# Patient Record
Sex: FEMALE | Race: WHITE | Employment: FULL TIME | URBAN - METROPOLITAN AREA
[De-identification: names, ages, dates, MRNs, and addresses within clinical notes are randomized per-mention and may not be internally consistent; named-entity substitution may affect disease eponyms.]

---

## 2020-12-07 LAB — SARS-COV-2: DETECTED

## 2020-12-10 ENCOUNTER — APPOINTMENT (OUTPATIENT)
Dept: GENERAL RADIOLOGY | Age: 40
DRG: 177 | End: 2020-12-10
Payer: COMMERCIAL

## 2020-12-10 ENCOUNTER — HOSPITAL ENCOUNTER (INPATIENT)
Age: 40
LOS: 2 days | Discharge: HOME | DRG: 177 | End: 2020-12-12
Attending: EMERGENCY MEDICINE | Admitting: HOSPITALIST
Payer: COMMERCIAL

## 2020-12-10 PROBLEM — J12.82 PNEUMONIA DUE TO COVID-19 VIRUS: Status: ACTIVE | Noted: 2020-12-10

## 2020-12-10 PROBLEM — U07.1 PNEUMONIA DUE TO COVID-19 VIRUS: Status: ACTIVE | Noted: 2020-12-10

## 2020-12-10 PROBLEM — J96.01 ACUTE HYPOXEMIC RESPIRATORY FAILURE DUE TO SEVERE ACUTE RESPIRATORY SYNDROME CORONAVIRUS 2 (SARS-COV-2) DISEASE (HCC): Status: ACTIVE | Noted: 2020-12-10

## 2020-12-10 PROBLEM — J96.01 ACUTE HYPOXEMIC RESPIRATORY FAILURE DUE TO COVID-19 (HCC): Status: ACTIVE | Noted: 2020-12-10

## 2020-12-10 PROBLEM — U07.1 ACUTE HYPOXEMIC RESPIRATORY FAILURE DUE TO SEVERE ACUTE RESPIRATORY SYNDROME CORONAVIRUS 2 (SARS-COV-2) DISEASE (HCC): Status: ACTIVE | Noted: 2020-12-10

## 2020-12-10 PROBLEM — E66.01 MORBID OBESITY WITH BODY MASS INDEX (BMI) OF 50.0 TO 59.9 IN ADULT (HCC): Status: ACTIVE | Noted: 2020-12-10

## 2020-12-10 PROBLEM — R03.0 ELEVATED BP WITHOUT DIAGNOSIS OF HYPERTENSION: Status: ACTIVE | Noted: 2020-12-10

## 2020-12-10 PROBLEM — U07.1 ACUTE HYPOXEMIC RESPIRATORY FAILURE DUE TO COVID-19 (HCC): Status: ACTIVE | Noted: 2020-12-10

## 2020-12-10 PROBLEM — J45.909 RAD (REACTIVE AIRWAY DISEASE): Status: ACTIVE | Noted: 2020-12-10

## 2020-12-10 LAB
A/G RATIO: 0.8 (ref 1.1–2.2)
A/G RATIO: 0.9 (ref 1.1–2.2)
ABO/RH: NORMAL
ABO/RH: NORMAL
ALBUMIN SERPL-MCNC: 3.6 G/DL (ref 3.4–5)
ALBUMIN SERPL-MCNC: 3.8 G/DL (ref 3.4–5)
ALP BLD-CCNC: 80 U/L (ref 40–129)
ALP BLD-CCNC: 88 U/L (ref 40–129)
ALT SERPL-CCNC: 48 U/L (ref 10–40)
ALT SERPL-CCNC: 49 U/L (ref 10–40)
ANION GAP SERPL CALCULATED.3IONS-SCNC: 11 MMOL/L (ref 3–16)
ANION GAP SERPL CALCULATED.3IONS-SCNC: 11 MMOL/L (ref 3–16)
ANTIBODY SCREEN: NORMAL
AST SERPL-CCNC: 49 U/L (ref 15–37)
AST SERPL-CCNC: 57 U/L (ref 15–37)
BASOPHILS ABSOLUTE: 0 K/UL (ref 0–0.2)
BASOPHILS RELATIVE PERCENT: 0.6 %
BILIRUB SERPL-MCNC: 0.3 MG/DL (ref 0–1)
BILIRUB SERPL-MCNC: 0.4 MG/DL (ref 0–1)
BUN BLDV-MCNC: 14 MG/DL (ref 7–20)
BUN BLDV-MCNC: 14 MG/DL (ref 7–20)
CALCIUM SERPL-MCNC: 8.8 MG/DL (ref 8.3–10.6)
CALCIUM SERPL-MCNC: 9 MG/DL (ref 8.3–10.6)
CHLORIDE BLD-SCNC: 100 MMOL/L (ref 99–110)
CHLORIDE BLD-SCNC: 101 MMOL/L (ref 99–110)
CO2: 23 MMOL/L (ref 21–32)
CO2: 25 MMOL/L (ref 21–32)
CREAT SERPL-MCNC: 0.8 MG/DL (ref 0.6–1.1)
CREAT SERPL-MCNC: 0.9 MG/DL (ref 0.6–1.1)
D DIMER: 504 NG/ML DDU (ref 0–229)
EKG ATRIAL RATE: 83 BPM
EKG DIAGNOSIS: NORMAL
EKG P AXIS: 22 DEGREES
EKG P-R INTERVAL: 138 MS
EKG Q-T INTERVAL: 370 MS
EKG QRS DURATION: 84 MS
EKG QTC CALCULATION (BAZETT): 434 MS
EKG R AXIS: -16 DEGREES
EKG T AXIS: 12 DEGREES
EKG VENTRICULAR RATE: 83 BPM
EOSINOPHILS ABSOLUTE: 0 K/UL (ref 0–0.6)
EOSINOPHILS RELATIVE PERCENT: 0.2 %
FIBRINOGEN: 515 MG/DL (ref 200–397)
GFR AFRICAN AMERICAN: >60
GFR AFRICAN AMERICAN: >60
GFR NON-AFRICAN AMERICAN: >60
GFR NON-AFRICAN AMERICAN: >60
GLOBULIN: 4.3 G/DL
GLOBULIN: 4.4 G/DL
GLUCOSE BLD-MCNC: 114 MG/DL (ref 70–99)
GLUCOSE BLD-MCNC: 118 MG/DL (ref 70–99)
HCG QUALITATIVE: NEGATIVE
HCT VFR BLD CALC: 46 % (ref 36–48)
HEMOGLOBIN: 15.2 G/DL (ref 12–16)
INR BLD: 1.06 (ref 0.86–1.14)
LACTATE DEHYDROGENASE: 526 U/L (ref 100–190)
LACTIC ACID, SEPSIS: 1.5 MMOL/L (ref 0.4–1.9)
LYMPHOCYTES ABSOLUTE: 1.5 K/UL (ref 1–5.1)
LYMPHOCYTES RELATIVE PERCENT: 32.9 %
MCH RBC QN AUTO: 27.5 PG (ref 26–34)
MCHC RBC AUTO-ENTMCNC: 32.9 G/DL (ref 31–36)
MCV RBC AUTO: 83.5 FL (ref 80–100)
MONOCYTES ABSOLUTE: 0.4 K/UL (ref 0–1.3)
MONOCYTES RELATIVE PERCENT: 8.9 %
NEUTROPHILS ABSOLUTE: 2.7 K/UL (ref 1.7–7.7)
NEUTROPHILS RELATIVE PERCENT: 57.4 %
PDW BLD-RTO: 13.9 % (ref 12.4–15.4)
PLATELET # BLD: 243 K/UL (ref 135–450)
PMV BLD AUTO: 7.7 FL (ref 5–10.5)
POTASSIUM REFLEX MAGNESIUM: 3.8 MMOL/L (ref 3.5–5.1)
POTASSIUM REFLEX MAGNESIUM: 5 MMOL/L (ref 3.5–5.1)
PRO-BNP: 7 PG/ML (ref 0–124)
PROCALCITONIN: 0.07 NG/ML (ref 0–0.15)
PROTHROMBIN TIME: 12.3 SEC (ref 10–13.2)
RBC # BLD: 5.51 M/UL (ref 4–5.2)
SODIUM BLD-SCNC: 135 MMOL/L (ref 136–145)
SODIUM BLD-SCNC: 136 MMOL/L (ref 136–145)
TOTAL PROTEIN: 8 G/DL (ref 6.4–8.2)
TOTAL PROTEIN: 8.1 G/DL (ref 6.4–8.2)
TROPONIN: <0.01 NG/ML
TROPONIN: <0.01 NG/ML
WBC # BLD: 4.7 K/UL (ref 4–11)

## 2020-12-10 PROCEDURE — 86900 BLOOD TYPING SEROLOGIC ABO: CPT

## 2020-12-10 PROCEDURE — 83615 LACTATE (LD) (LDH) ENZYME: CPT

## 2020-12-10 PROCEDURE — 84484 ASSAY OF TROPONIN QUANT: CPT

## 2020-12-10 PROCEDURE — 83605 ASSAY OF LACTIC ACID: CPT

## 2020-12-10 PROCEDURE — 93005 ELECTROCARDIOGRAM TRACING: CPT | Performed by: EMERGENCY MEDICINE

## 2020-12-10 PROCEDURE — 96365 THER/PROPH/DIAG IV INF INIT: CPT

## 2020-12-10 PROCEDURE — 71045 X-RAY EXAM CHEST 1 VIEW: CPT

## 2020-12-10 PROCEDURE — 36415 COLL VENOUS BLD VENIPUNCTURE: CPT

## 2020-12-10 PROCEDURE — 99284 EMERGENCY DEPT VISIT MOD MDM: CPT

## 2020-12-10 PROCEDURE — 82306 VITAMIN D 25 HYDROXY: CPT

## 2020-12-10 PROCEDURE — 84145 PROCALCITONIN (PCT): CPT

## 2020-12-10 PROCEDURE — 86850 RBC ANTIBODY SCREEN: CPT

## 2020-12-10 PROCEDURE — 85384 FIBRINOGEN ACTIVITY: CPT

## 2020-12-10 PROCEDURE — 93010 ELECTROCARDIOGRAM REPORT: CPT | Performed by: INTERNAL MEDICINE

## 2020-12-10 PROCEDURE — 2580000003 HC RX 258: Performed by: HOSPITALIST

## 2020-12-10 PROCEDURE — 2700000000 HC OXYGEN THERAPY PER DAY

## 2020-12-10 PROCEDURE — 1200000000 HC SEMI PRIVATE

## 2020-12-10 PROCEDURE — 80053 COMPREHEN METABOLIC PANEL: CPT

## 2020-12-10 PROCEDURE — 96375 TX/PRO/DX INJ NEW DRUG ADDON: CPT

## 2020-12-10 PROCEDURE — 86901 BLOOD TYPING SEROLOGIC RH(D): CPT

## 2020-12-10 PROCEDURE — 94761 N-INVAS EAR/PLS OXIMETRY MLT: CPT

## 2020-12-10 PROCEDURE — 85610 PROTHROMBIN TIME: CPT

## 2020-12-10 PROCEDURE — 85379 FIBRIN DEGRADATION QUANT: CPT

## 2020-12-10 PROCEDURE — 6360000002 HC RX W HCPCS: Performed by: HOSPITALIST

## 2020-12-10 PROCEDURE — 87040 BLOOD CULTURE FOR BACTERIA: CPT

## 2020-12-10 PROCEDURE — 2580000003 HC RX 258: Performed by: EMERGENCY MEDICINE

## 2020-12-10 PROCEDURE — 84703 CHORIONIC GONADOTROPIN ASSAY: CPT

## 2020-12-10 PROCEDURE — 6370000000 HC RX 637 (ALT 250 FOR IP): Performed by: EMERGENCY MEDICINE

## 2020-12-10 PROCEDURE — 6370000000 HC RX 637 (ALT 250 FOR IP): Performed by: HOSPITALIST

## 2020-12-10 PROCEDURE — 85025 COMPLETE CBC W/AUTO DIFF WBC: CPT

## 2020-12-10 PROCEDURE — 83880 ASSAY OF NATRIURETIC PEPTIDE: CPT

## 2020-12-10 PROCEDURE — 6360000002 HC RX W HCPCS: Performed by: EMERGENCY MEDICINE

## 2020-12-10 RX ORDER — ALBUTEROL SULFATE 90 UG/1
2 AEROSOL, METERED RESPIRATORY (INHALATION) EVERY 4 HOURS PRN
Status: DISCONTINUED | OUTPATIENT
Start: 2020-12-10 | End: 2020-12-12 | Stop reason: HOSPADM

## 2020-12-10 RX ORDER — ALBUTEROL SULFATE 90 UG/1
2 AEROSOL, METERED RESPIRATORY (INHALATION) 2 TIMES DAILY
Status: DISCONTINUED | OUTPATIENT
Start: 2020-12-11 | End: 2020-12-12 | Stop reason: HOSPADM

## 2020-12-10 RX ORDER — GUAIFENESIN/DEXTROMETHORPHAN 100-10MG/5
5 SYRUP ORAL EVERY 4 HOURS PRN
Status: DISCONTINUED | OUTPATIENT
Start: 2020-12-10 | End: 2020-12-12 | Stop reason: HOSPADM

## 2020-12-10 RX ORDER — BENZONATATE 200 MG/1
CAPSULE ORAL
COMMUNITY
Start: 2020-12-08

## 2020-12-10 RX ORDER — ONDANSETRON 4 MG/1
4 TABLET, ORALLY DISINTEGRATING ORAL EVERY 8 HOURS PRN
Status: DISCONTINUED | OUTPATIENT
Start: 2020-12-10 | End: 2020-12-12 | Stop reason: HOSPADM

## 2020-12-10 RX ORDER — POTASSIUM CHLORIDE 7.45 MG/ML
10 INJECTION INTRAVENOUS PRN
Status: DISCONTINUED | OUTPATIENT
Start: 2020-12-10 | End: 2020-12-12 | Stop reason: HOSPADM

## 2020-12-10 RX ORDER — METHYLPREDNISOLONE SODIUM SUCCINATE 40 MG/ML
40 INJECTION, POWDER, LYOPHILIZED, FOR SOLUTION INTRAMUSCULAR; INTRAVENOUS EVERY 12 HOURS
Status: DISCONTINUED | OUTPATIENT
Start: 2020-12-11 | End: 2020-12-11

## 2020-12-10 RX ORDER — ACETAMINOPHEN 325 MG/1
650 TABLET ORAL EVERY 6 HOURS PRN
Status: DISCONTINUED | OUTPATIENT
Start: 2020-12-10 | End: 2020-12-10 | Stop reason: SDUPTHER

## 2020-12-10 RX ORDER — ACETAMINOPHEN 650 MG/1
650 SUPPOSITORY RECTAL EVERY 6 HOURS PRN
Status: DISCONTINUED | OUTPATIENT
Start: 2020-12-10 | End: 2020-12-10 | Stop reason: SDUPTHER

## 2020-12-10 RX ORDER — ACETAMINOPHEN, ASPIRIN AND CAFFEINE 250; 250; 65 MG/1; MG/1; MG/1
TABLET, FILM COATED ORAL
COMMUNITY

## 2020-12-10 RX ORDER — DEXTROSE MONOHYDRATE 25 G/50ML
12.5 INJECTION, SOLUTION INTRAVENOUS PRN
Status: DISCONTINUED | OUTPATIENT
Start: 2020-12-10 | End: 2020-12-12 | Stop reason: HOSPADM

## 2020-12-10 RX ORDER — CLONIDINE HYDROCHLORIDE 0.1 MG/1
0.1 TABLET ORAL EVERY 8 HOURS PRN
Status: DISCONTINUED | OUTPATIENT
Start: 2020-12-10 | End: 2020-12-11

## 2020-12-10 RX ORDER — ALBUTEROL SULFATE 90 UG/1
2 AEROSOL, METERED RESPIRATORY (INHALATION) 4 TIMES DAILY
Status: DISCONTINUED | OUTPATIENT
Start: 2020-12-10 | End: 2020-12-10

## 2020-12-10 RX ORDER — SODIUM CHLORIDE 9 MG/ML
INJECTION, SOLUTION INTRAVENOUS CONTINUOUS
Status: ACTIVE | OUTPATIENT
Start: 2020-12-10 | End: 2020-12-11

## 2020-12-10 RX ORDER — MAGNESIUM SULFATE 1 G/100ML
1 INJECTION INTRAVENOUS PRN
Status: DISCONTINUED | OUTPATIENT
Start: 2020-12-10 | End: 2020-12-12 | Stop reason: HOSPADM

## 2020-12-10 RX ORDER — SODIUM CHLORIDE 0.9 % (FLUSH) 0.9 %
10 SYRINGE (ML) INJECTION PRN
Status: DISCONTINUED | OUTPATIENT
Start: 2020-12-10 | End: 2020-12-12 | Stop reason: HOSPADM

## 2020-12-10 RX ORDER — POTASSIUM CHLORIDE 20 MEQ/1
40 TABLET, EXTENDED RELEASE ORAL PRN
Status: DISCONTINUED | OUTPATIENT
Start: 2020-12-10 | End: 2020-12-12 | Stop reason: HOSPADM

## 2020-12-10 RX ORDER — VITAMIN B COMPLEX
2000 TABLET ORAL DAILY
Status: DISCONTINUED | OUTPATIENT
Start: 2020-12-10 | End: 2020-12-12 | Stop reason: HOSPADM

## 2020-12-10 RX ORDER — ALBUTEROL SULFATE 90 UG/1
AEROSOL, METERED RESPIRATORY (INHALATION)
COMMUNITY
Start: 2020-12-08

## 2020-12-10 RX ORDER — AZITHROMYCIN 250 MG/1
500 TABLET, FILM COATED ORAL ONCE
Status: COMPLETED | OUTPATIENT
Start: 2020-12-10 | End: 2020-12-10

## 2020-12-10 RX ORDER — ACETAMINOPHEN 325 MG/1
650 TABLET ORAL EVERY 6 HOURS PRN
Status: DISCONTINUED | OUTPATIENT
Start: 2020-12-10 | End: 2020-12-12 | Stop reason: HOSPADM

## 2020-12-10 RX ORDER — FAMOTIDINE 20 MG/1
20 TABLET, FILM COATED ORAL 2 TIMES DAILY
Status: DISCONTINUED | OUTPATIENT
Start: 2020-12-10 | End: 2020-12-12 | Stop reason: HOSPADM

## 2020-12-10 RX ORDER — 0.9 % SODIUM CHLORIDE 0.9 %
1000 INTRAVENOUS SOLUTION INTRAVENOUS ONCE
Status: COMPLETED | OUTPATIENT
Start: 2020-12-10 | End: 2020-12-10

## 2020-12-10 RX ORDER — NICOTINE POLACRILEX 4 MG
15 LOZENGE BUCCAL PRN
Status: DISCONTINUED | OUTPATIENT
Start: 2020-12-10 | End: 2020-12-12 | Stop reason: HOSPADM

## 2020-12-10 RX ORDER — CEFUROXIME AXETIL 500 MG/1
TABLET ORAL
Status: ON HOLD | COMMUNITY
Start: 2020-12-10 | End: 2020-12-12 | Stop reason: HOSPADM

## 2020-12-10 RX ORDER — DEXAMETHASONE SODIUM PHOSPHATE 4 MG/ML
4 INJECTION, SOLUTION INTRA-ARTICULAR; INTRALESIONAL; INTRAMUSCULAR; INTRAVENOUS; SOFT TISSUE ONCE
Status: COMPLETED | OUTPATIENT
Start: 2020-12-10 | End: 2020-12-10

## 2020-12-10 RX ORDER — DEXTROSE MONOHYDRATE 50 MG/ML
100 INJECTION, SOLUTION INTRAVENOUS PRN
Status: DISCONTINUED | OUTPATIENT
Start: 2020-12-10 | End: 2020-12-12 | Stop reason: HOSPADM

## 2020-12-10 RX ORDER — ACETAMINOPHEN 650 MG/1
650 SUPPOSITORY RECTAL EVERY 6 HOURS PRN
Status: DISCONTINUED | OUTPATIENT
Start: 2020-12-10 | End: 2020-12-12 | Stop reason: HOSPADM

## 2020-12-10 RX ORDER — ONDANSETRON 2 MG/ML
4 INJECTION INTRAMUSCULAR; INTRAVENOUS EVERY 6 HOURS PRN
Status: DISCONTINUED | OUTPATIENT
Start: 2020-12-10 | End: 2020-12-12 | Stop reason: HOSPADM

## 2020-12-10 RX ADMIN — CEFTRIAXONE SODIUM 1 G: 1 INJECTION, POWDER, FOR SOLUTION INTRAMUSCULAR; INTRAVENOUS at 17:25

## 2020-12-10 RX ADMIN — SODIUM CHLORIDE 1000 ML: 9 INJECTION, SOLUTION INTRAVENOUS at 18:33

## 2020-12-10 RX ADMIN — Medication 2000 UNITS: at 20:35

## 2020-12-10 RX ADMIN — FAMOTIDINE 20 MG: 20 TABLET ORAL at 20:35

## 2020-12-10 RX ADMIN — ENOXAPARIN SODIUM 30 MG: 30 INJECTION SUBCUTANEOUS at 20:35

## 2020-12-10 RX ADMIN — SODIUM CHLORIDE: 9 INJECTION, SOLUTION INTRAVENOUS at 20:36

## 2020-12-10 RX ADMIN — AZITHROMYCIN MONOHYDRATE 500 MG: 250 TABLET ORAL at 17:27

## 2020-12-10 RX ADMIN — DEXAMETHASONE SODIUM PHOSPHATE 4 MG: 4 INJECTION, SOLUTION INTRA-ARTICULAR; INTRALESIONAL; INTRAMUSCULAR; INTRAVENOUS; SOFT TISSUE at 17:27

## 2020-12-10 ASSESSMENT — ENCOUNTER SYMPTOMS
SHORTNESS OF BREATH: 1
SORE THROAT: 0
STRIDOR: 0
CHEST TIGHTNESS: 0
RHINORRHEA: 0
ABDOMINAL PAIN: 0
DIARRHEA: 0
TROUBLE SWALLOWING: 0
VOMITING: 0
WHEEZING: 0
COUGH: 1

## 2020-12-10 ASSESSMENT — PAIN SCALES - GENERAL
PAINLEVEL_OUTOF10: 3
PAINLEVEL_OUTOF10: 0

## 2020-12-10 NOTE — ED NOTES
[address] [facility]  pneumonia, covid+, hypoxic  1813  called back     Karen Pringle  12/10/20 1813

## 2020-12-10 NOTE — VCC REMOTE MONITORING
Spoke to primary RN regarding sepsis bundle ( Abx)    [facility], [address]  [phone]

## 2020-12-10 NOTE — ED PROVIDER NOTES
reviewed. No pertinent past medical history. SURGICAL HISTORY       Past Surgical History:   Procedure Laterality Date    KIDNEY SURGERY           CURRENT MEDICATIONS       Previous Medications    ALBUTEROL SULFATE  (90 BASE) MCG/ACT INHALER    INHALE 2 PUFFS BY MOUTH EVERY SIX HOURS AS NEEDED    ASPIRIN-ACETAMINOPHEN-CAFFEINE (EXCEDRIN MIGRAINE) 250-250-65 MG PER TABLET    Take by mouth    BENZONATATE (TESSALON) 200 MG CAPSULE    TAKE 1 CAPSULE BY MOUTH THREE TIMES A DAY AS NEEDED FOR COUGH    CEFUROXIME (CEFTIN) 500 MG TABLET           ALLERGIES     Patient has no known allergies. FAMILY HISTORY     History reviewed. No pertinent family history.        SOCIAL HISTORY       Social History     Socioeconomic History    Marital status: Single     Spouse name: None    Number of children: None    Years of education: None    Highest education level: None   Occupational History    None   Social Needs    Financial resource strain: None    Food insecurity     Worry: None     Inability: None    Transportation needs     Medical: None     Non-medical: None   Tobacco Use    Smoking status: Never Smoker    Smokeless tobacco: Never Used   Substance and Sexual Activity    Alcohol use: Not Currently    Drug use: None    Sexual activity: None   Lifestyle    Physical activity     Days per week: None     Minutes per session: None    Stress: None   Relationships    Social connections     Talks on phone: None     Gets together: None     Attends [employer] service: None     Active member of club or organization: None     Attends meetings of clubs or organizations: None     Relationship status: None    Intimate partner violence     Fear of current or ex partner: None     Emotionally abused: None     Physically abused: None     Forced sexual activity: None   Other Topics Concern    None   Social History Narrative    None       SCREENINGS             PHYSICAL EXAM    (up to 7 for level 4, 8 ormore for level 5)     ED Triage Vitals   BP Temp Temp Source Pulse Resp SpO2 Height Weight   12/10/20 1528 12/10/20 1528 12/10/20 1528 12/10/20 1500 12/10/20 1528 12/10/20 1500 12/10/20 1528 12/10/20 1528   (!) 179/139 98.2 °F (36.8 °C) Oral 115 (!) 31 94 % 5' 3.5\" (1.613 m) (!) 325 lb (147.4 kg)       Physical Exam  Constitutional:       General: She is not in acute distress. Appearance: Normal appearance. She is well-developed. She is not ill-appearing or toxic-appearing. Comments: Sitting in bed comfortably, speaking in full sentences, following verbal commands appropriately. Not in acute distress     HENT:      Head: Normocephalic and atraumatic. Eyes:      Conjunctiva/sclera: Conjunctivae normal.      Pupils: Pupils are equal, round, and reactive to light. Neck:      Musculoskeletal: Normal range of motion and neck supple. Cardiovascular:      Rate and Rhythm: Normal rate and regular rhythm. Heart sounds: Normal heart sounds. No murmur. No friction rub. No gallop. Pulmonary:      Effort: Pulmonary effort is normal. No respiratory distress. Breath sounds: Examination of the left-lower field reveals decreased breath sounds. Decreased breath sounds present. No wheezing, rhonchi or rales. Abdominal:      General: Bowel sounds are normal. There is no distension. Palpations: Abdomen is soft. Tenderness: There is no abdominal tenderness. There is no guarding or rebound. Musculoskeletal: Normal range of motion. General: No tenderness or deformity. Skin:     General: Skin is warm and dry. Findings: No rash. Neurological:      Mental Status: She is alert and oriented to person, place, and time. GCS: GCS eye subscore is 4. GCS verbal subscore is 5. GCS motor subscore is 6. Psychiatric:         Behavior: Behavior is cooperative.          DIAGNOSTIC RESULTS     EKG: All EKG's are interpreted by the Emergency Department Physicianwho either signs or Co-signs this chart in the absence of a cardiologist.    The Ekg interpreted by me shows  normal sinus rhythm with a rate of 83  Axis is   Normal  QTc is  434  Intervals and Durations are unremarkable. ST Segments: nonspecific changes  No prior ekg      RADIOLOGY:   Non-plain film images such as CT, Ultrasound and MRI are read by the radiologist. Plain radiographic images are visualized and preliminarily interpreted by the emergency physician with the below findings:      Interpretation per the Radiologist below, if available at the time of this note:    XR CHEST PORTABLE   Final Result   Left lower lobe pneumonia.                ED BEDSIDE ULTRASOUND:   Performed by ED Physician - none    LABS:  Labs Reviewed   CBC WITH AUTO DIFFERENTIAL - Abnormal; Notable for the following components:       Result Value    RBC 5.51 (*)     All other components within normal limits    Narrative:     Performed at:  [place] [address] [address]   Phone [phone]   COMPREHENSIVE METABOLIC PANEL W/ REFLEX TO MG FOR LOW K - Abnormal; Notable for the following components:    Glucose 118 (*)     Albumin/Globulin Ratio 0.9 (*)     ALT 48 (*)     AST 49 (*)     All other components within normal limits    Narrative:     Performed at:  [place] [address] [address]   Phone [phone]   CULTURE, BLOOD 1   CULTURE, BLOOD 2   HCG, SERUM, QUALITATIVE    Narrative:     Performed at:  [place] [address] [address]   Phone [phone]   LACTATE, SEPSIS    Narrative:     Performed at:  [place] [address] [address]   Phone [phone]   TROPONIN    Narrative:     Performed at:  [place] [address] [address]   Phone [phone]   LACTATE, SEPSIS   BLOOD GAS, VENOUS       All other labs were within normal range ornot returned as of this dictation. EMERGENCY DEPARTMENT COURSE and DIFFERENTIAL DIAGNOSIS/MDM:   Vitals:    Vitals:    12/10/20 1711 12/10/20 1715 12/10/20 1732 12/10/20 1736   BP: 137/88      Pulse: 76 81 64 77   Resp:       Temp:       TempSrc:       SpO2: 99% 98% 97% 97%   Weight:       Height:             MDM    ED COURSE/MDM    -Jorge Rodriguez is a 41 y.o. female with significant medical history presents to ED for shortness of breath and cough. She states that she was tested positive for Covid results returned on Monday and since then she has been having worsening shortness of breath.  -upon arrival patient afebrile, tachcyardic at 121 bpm, RR of 31 with oxygen saturation of 88%. After being put on 1.5 L nasal cannula, oxygen saturation improved to 97% and tachycardia resolved to rate of 77.  -Chest x-ray shows left lower lobe pneumonia  -She was given 1 L IV fluid bolus, ceftriaxone azithromycin to cover for community-acquired pneumonia as well as dexamethasone.    -Labs and imaging reviewed and results discussed with patient. Plan for admission for further workup and treatment for aspiratory failure due to pneumonia in the setting of Covid and patient in agreement with plan and have nofurther questions/concerns      REASSESSMENT      Well appearing, non toxic, alert, oriented speaking in full sentences and hemodynamically stable upon admission      CRITICAL CARE TIME   Total Critical Care time was 35 minutes, excluding separately reportableprocedures. There was a high probability of clinicallysignificant/life threatening deterioration in the patient's condition which required my urgent intervention. CONSULTS:  IP CONSULT TO HOSPITALIST    PROCEDURES:  Unless otherwise noted below, none     Procedures    FINAL IMPRESSION      1. Acute respiratory failure with hypoxia ([facility] [address]    2. Pneumonia due to organism    3.  COVID-19 virus infection          DISPOSITION/PLAN DISPOSITION        PATIENT REFERREDTO:  No follow-up provider specified.     DISCHARGE MEDICATIONS:  New Prescriptions    No medications on file          (Please note that portions of this note were completed with a voice recognition program.  Efforts were made to edit the dictations but occasionally wordsare mis-transcribed.)    Christophe Sepulveda MD (electronically signed)  Attending Emergency Physician            Christophe Sepulveda MD  12/10/20 5691       Christophe Sepulveda MD  12/10/20 1462

## 2020-12-11 LAB
A/G RATIO: 1 (ref 1.1–2.2)
ALBUMIN SERPL-MCNC: 3.8 G/DL (ref 3.4–5)
ALP BLD-CCNC: 83 U/L (ref 40–129)
ALT SERPL-CCNC: 49 U/L (ref 10–40)
ANION GAP SERPL CALCULATED.3IONS-SCNC: 9 MMOL/L (ref 3–16)
AST SERPL-CCNC: 44 U/L (ref 15–37)
BILIRUB SERPL-MCNC: 0.3 MG/DL (ref 0–1)
BUN BLDV-MCNC: 15 MG/DL (ref 7–20)
CALCIUM SERPL-MCNC: 9.1 MG/DL (ref 8.3–10.6)
CHLORIDE BLD-SCNC: 104 MMOL/L (ref 99–110)
CO2: 23 MMOL/L (ref 21–32)
CREAT SERPL-MCNC: 0.7 MG/DL (ref 0.6–1.1)
D DIMER: 455 NG/ML DDU (ref 0–229)
ESTIMATED AVERAGE GLUCOSE: 151.3 MG/DL
FIBRINOGEN: 451 MG/DL (ref 200–397)
GFR AFRICAN AMERICAN: >60
GFR NON-AFRICAN AMERICAN: >60
GLOBULIN: 4 G/DL
GLUCOSE BLD-MCNC: 146 MG/DL (ref 70–99)
GLUCOSE BLD-MCNC: 162 MG/DL (ref 70–99)
GLUCOSE BLD-MCNC: 177 MG/DL (ref 70–99)
GLUCOSE BLD-MCNC: 188 MG/DL (ref 70–99)
GLUCOSE BLD-MCNC: 219 MG/DL (ref 70–99)
HBA1C MFR BLD: 6.9 %
INR BLD: 1.1 (ref 0.86–1.14)
PERFORMED ON: ABNORMAL
POTASSIUM REFLEX MAGNESIUM: 4.8 MMOL/L (ref 3.5–5.1)
PROTHROMBIN TIME: 12.8 SEC (ref 10–13.2)
SODIUM BLD-SCNC: 136 MMOL/L (ref 136–145)
TOTAL PROTEIN: 7.8 G/DL (ref 6.4–8.2)
TROPONIN: <0.01 NG/ML
TROPONIN: <0.01 NG/ML
VITAMIN D 25-HYDROXY: 14.1 NG/ML

## 2020-12-11 PROCEDURE — 1200000000 HC SEMI PRIVATE

## 2020-12-11 PROCEDURE — 85610 PROTHROMBIN TIME: CPT

## 2020-12-11 PROCEDURE — 84484 ASSAY OF TROPONIN QUANT: CPT

## 2020-12-11 PROCEDURE — 94640 AIRWAY INHALATION TREATMENT: CPT

## 2020-12-11 PROCEDURE — 80053 COMPREHEN METABOLIC PANEL: CPT

## 2020-12-11 PROCEDURE — 6370000000 HC RX 637 (ALT 250 FOR IP): Performed by: HOSPITALIST

## 2020-12-11 PROCEDURE — 85384 FIBRINOGEN ACTIVITY: CPT

## 2020-12-11 PROCEDURE — 6360000002 HC RX W HCPCS: Performed by: HOSPITALIST

## 2020-12-11 PROCEDURE — 85379 FIBRIN DEGRADATION QUANT: CPT

## 2020-12-11 PROCEDURE — 94761 N-INVAS EAR/PLS OXIMETRY MLT: CPT

## 2020-12-11 PROCEDURE — 2580000003 HC RX 258: Performed by: HOSPITALIST

## 2020-12-11 PROCEDURE — 2700000000 HC OXYGEN THERAPY PER DAY

## 2020-12-11 PROCEDURE — 83036 HEMOGLOBIN GLYCOSYLATED A1C: CPT

## 2020-12-11 RX ORDER — DEXAMETHASONE 4 MG/1
6 TABLET ORAL DAILY
Status: DISCONTINUED | OUTPATIENT
Start: 2020-12-12 | End: 2020-12-12 | Stop reason: HOSPADM

## 2020-12-11 RX ADMIN — INSULIN LISPRO 2 UNITS: 100 INJECTION, SOLUTION INTRAVENOUS; SUBCUTANEOUS at 09:09

## 2020-12-11 RX ADMIN — AZITHROMYCIN MONOHYDRATE 500 MG: 500 INJECTION, POWDER, LYOPHILIZED, FOR SOLUTION INTRAVENOUS at 14:10

## 2020-12-11 RX ADMIN — ENOXAPARIN SODIUM 40 MG: 40 INJECTION SUBCUTANEOUS at 22:42

## 2020-12-11 RX ADMIN — Medication 2000 UNITS: at 09:08

## 2020-12-11 RX ADMIN — ENOXAPARIN SODIUM 40 MG: 40 INJECTION SUBCUTANEOUS at 09:08

## 2020-12-11 RX ADMIN — INSULIN LISPRO 4 UNITS: 100 INJECTION, SOLUTION INTRAVENOUS; SUBCUTANEOUS at 12:22

## 2020-12-11 RX ADMIN — Medication 2 PUFF: at 19:49

## 2020-12-11 RX ADMIN — FAMOTIDINE 20 MG: 20 TABLET ORAL at 09:08

## 2020-12-11 RX ADMIN — CEFTRIAXONE SODIUM 1 G: 1 INJECTION, POWDER, FOR SOLUTION INTRAMUSCULAR; INTRAVENOUS at 09:09

## 2020-12-11 RX ADMIN — INSULIN LISPRO 2 UNITS: 100 INJECTION, SOLUTION INTRAVENOUS; SUBCUTANEOUS at 17:38

## 2020-12-11 RX ADMIN — INSULIN LISPRO 1 UNITS: 100 INJECTION, SOLUTION INTRAVENOUS; SUBCUTANEOUS at 22:43

## 2020-12-11 RX ADMIN — SODIUM CHLORIDE: 9 INJECTION, SOLUTION INTRAVENOUS at 05:21

## 2020-12-11 RX ADMIN — METHYLPREDNISOLONE SODIUM SUCCINATE 40 MG: 40 INJECTION, POWDER, FOR SOLUTION INTRAMUSCULAR; INTRAVENOUS at 01:14

## 2020-12-11 RX ADMIN — METHYLPREDNISOLONE SODIUM SUCCINATE 40 MG: 40 INJECTION, POWDER, FOR SOLUTION INTRAMUSCULAR; INTRAVENOUS at 12:21

## 2020-12-11 RX ADMIN — FAMOTIDINE 20 MG: 20 TABLET ORAL at 22:42

## 2020-12-11 RX ADMIN — Medication 2 PUFF: at 08:04

## 2020-12-11 ASSESSMENT — PAIN SCALES - GENERAL
PAINLEVEL_OUTOF10: 0
PAINLEVEL_OUTOF10: 0

## 2020-12-11 NOTE — PLAN OF CARE
Problem: Airway Clearance - Ineffective  Goal: Achieve or maintain patent airway  12/10/2020 2151 by Birdia Fleischer, RN  Outcome: Ongoing  12/10/2020 2151 by Birdia Fleischer, RN  Outcome: Ongoing

## 2020-12-11 NOTE — PROGRESS NOTES
Hospitalist Progress Note      PCP: Obdulia Greenberg MD    Date of Admission: 12/10/2020      Hospital Course: admitted with hypoxia and dyspnea due to covid 19 PNA    Subjective:    Patient seen and examined. Breathing improving, still with some JACKSON.  + cough. On 2 L NC, weaned to RA during exam.     Medications:  Reviewed    Infusion Medications    dextrose       Scheduled Medications    famotidine  20 mg Oral BID    methylPREDNISolone  40 mg Intravenous Q12H    Vitamin D  2,000 Units Oral Daily    insulin lispro  0-12 Units Subcutaneous TID WC    insulin lispro  0-6 Units Subcutaneous Nightly    cefTRIAXone (ROCEPHIN) IV  1 g Intravenous Q24H    azithromycin  500 mg Intravenous Q24H    albuterol sulfate HFA  2 puff Inhalation BID    enoxaparin  40 mg Subcutaneous BID     PRN Meds: sodium chloride flush, potassium chloride **OR** potassium alternative oral replacement **OR** potassium chloride, magnesium sulfate, acetaminophen **OR** acetaminophen, glucose, dextrose, glucagon (rDNA), dextrose, ondansetron **OR** ondansetron, guaiFENesin-dextromethorphan, cloNIDine, albuterol sulfate HFA      Intake/Output Summary (Last 24 hours) at 12/11/2020 1709  Last data filed at 12/11/2020 0524  Gross per 24 hour   Intake 910 ml   Output --   Net 910 ml       Physical Exam Performed:    BP (!) 132/93   Pulse 79   Temp 98.2 °F (36.8 °C) (Oral)   Resp 19   Ht 5' 3.5\" (1.613 m)   Wt (!) 325 lb (147.4 kg)   LMP 10/10/2020   SpO2 94%   BMI 56.67 kg/m²     General appearance: No apparent distress, alert and cooperative. HEENT: Conjunctivae/corneas clear, neck supple w/ full ROM  Respiratory:  Normal respiratory effort. Clear to auscultation, bilaterally without Rales/Wheezes/Rhonchi. Cardiovascular: Regular rate and rhythm, normal S1/S2, no murmurs  Abdomen: Soft, non-tender, non-distended with normal bowel sounds.   Musculoskeletal: No edema bilaterally  Neurologic:  No new focal deficits  Psychiatric: Alert and oriented, normal insight  Capillary Refill: Brisk,< 3 seconds   Peripheral Pulses: +2 palpable, equal bilaterally       Labs:   Recent Labs     12/10/20  1538   WBC 4.7   HGB 15.2   HCT 46.0        Recent Labs     12/10/20  1538 12/10/20  1831 12/11/20  0316    135* 136   K 3.8 5.0 4.8    101 104   CO2 25 23 23   BUN 14 14 15   CREATININE 0.9 0.8 0.7   CALCIUM 9.0 8.8 9.1     Recent Labs     12/10/20  1538 12/10/20  1831 12/11/20  0316   AST 49* 57* 44*   ALT 48* 49* 49*   BILITOT 0.4 0.3 0.3   ALKPHOS 88 80 83     Recent Labs     12/10/20  1956 12/11/20  0316   INR 1.06 1.10     Recent Labs     12/10/20  1831 12/10/20  2347 12/11/20  0316   TROPONINI <0.01 <0.01 <0.01       Urinalysis:    No results found for: Bonna Greet, BACTERIA, RBCUA, BLOODU, SPECGRAV, GLUCOSEU    Radiology:  XR CHEST PORTABLE   Final Result   Left lower lobe pneumonia.                  Assessment/Plan:    Active Hospital Problems    Diagnosis    Morbid obesity with body mass index (BMI) of 50.0 to 59.9 in adult ([facility] [address] [E66.01, Z68.43]    Pneumonia due to COVID-19 virus [U07.1, J12.89]    Acute hypoxemic respiratory failure due to COVID-19 (HCC) [U07.1, J96.01]    RAD (reactive airway disease) [J45.909]    Elevated BP without diagnosis of hypertension [R03.0]    Acute hypoxemic respiratory failure due to severe acute respiratory syndrome coronavirus 2 (SARS-CoV-2) disease ([facility]) [U07.1, J96.01]     COVID 19 PNA  - droplet plus contact isolation  - IS  - c/w inhalers  - switch from IV solu-medrol to decadron 6mg daily to complete total 10 day course of steroids  - low pro calcitonin, d/c abx  - patient weaned off O2, will not initiate remdesivir or convalescent plasma at this time, if worsening would reconsider    Acute respiratory failure with hypoxia  - on 2 L NC, weaned to RA, monitor    Hyperglycemia  - in setting of steroids  - c/w ssi  - a1c - 6.9 - will recommend metformin at discharge    Elevated BP w/o dx of HTN  - monitor, if remains elevated would recommend starting lisinopril    Moribd Obesity  - Body mass index is 56.67 kg/m².      DVT Prophylaxis: lovenox  Diet: DIET CARDIAC; Carb Control: 4 carb choices (60 gms)/meal; No Added Salt (3-4 GM)  Code Status: Full Code      Dispo - home in 1-2 days    Pedro Earl MD

## 2020-12-11 NOTE — PROGRESS NOTES
RESPIRATORY THERAPY ASSESSMENT    Name:  Domo Rivers  Medical Record Number:  [id number]  Age: 41 y.o. Gender: female  : 1980  Today's Date:  12/10/2020  Room:  0536/0536-01    Assessment     Is the patient being admitted for a COPD or Asthma exacerbation? No   (If yes the patient will be seen every 4 hours for the first 24 hours and then reassessed)    Patient Admission Diagnosis      Allergies  No Known Allergies    Minimum Predicted Vital Capacity:     n/a          Actual Vital Capacity:      n/a              Pulmonary History:No history  Home Oxygen Therapy:  room air  Home Respiratory Therapy:Albuterol BID and PRN/recent prescription  Current Respiratory Therapy:  albuterol          Respiratory Severity Index(RSI)   Patients with orders for inhalation medications, oxygen, or any therapeutic treatment modality will be placed on Respiratory Protocol. They will be assessed with the first treatment and at least every 72 hours thereafter. The following severity scale will be used to determine frequency of treatment intervention.     Smoking History: No Smoking History = 0    Social History  Social History     Tobacco Use    Smoking status: Never Smoker    Smokeless tobacco: Never Used   Substance Use Topics    Alcohol use: Not Currently    Drug use: Not on file       Recent Surgical History: None = 0  Past Surgical History  Past Surgical History:   Procedure Laterality Date    KIDNEY SURGERY         Level of Consciousness: Alert, Oriented, and Cooperative = 0    Level of Activity: Walking unassisted = 0    Respiratory Pattern: Dyspnea with exertion;Irregular pattern;or RR less than 6 = 2    Breath Sounds: Diminshed bilaterally and/or crackles = 2    Sputum   ,  ,    Cough: Strong, spontaneous, non-productive = 0    Vital Signs   BP (!) 150/98   Pulse 75   Temp 98.9 °F (37.2 °C) (Oral)   Resp 20   Ht 5' 3.5\" (1.613 m)   Wt (!) 325 lb (147.4 kg)   LMP 10/10/2020   SpO2 94%   BMI 56.67 kg/m²   SPO2 (COPD values may differ): 88-89% on room air or greater than 92% on FiO2 28- 35% = 2    Peak Flow (asthma only): not applicable = 0    RSI: 5-6 = Q4hr PRN (every four hours as needed) for dyspnea        Plan       Goals: medication delivery    Patient/caregiver was educated on the proper method of use for Respiratory Care Devices:  Yes      Level of patient/caregiver understanding able to:   ? Verbalize understanding   ? Demonstrate understanding       ? Teach back        ? Needs reinforcement       ? No available caregiver               ? Other:     Response to education:  Excellent     Is patient being placed on Home Treatment Regimen? Yes     Does the patient have everything they need prior to discharge? NA     Comments: pt seen and chart reviewed    Plan of Care: albuterol bid and prn    Electronically signed by Christiano Garcia RCP on 12/10/2020 at 9:09 PM    Respiratory Protocol Guidelines     1. Assessment and treatment by Respiratory Therapy will be initiated for medication and therapeutic interventions upon initiation of aerosolized medication. 2. Physician will be contacted for respiratory rate (RR) greater than 35 breaths per minute. Therapy will be held for heart rate (HR) greater than 140 beats per minute, pending direction from physician. 3. Bronchodilators will be administered via Metered Dose Inhaler (MDI) with spacer when the following criteria are met:  a. Alert and cooperative     b. HR < 140 bpm  c. RR < 30 bpm                d. Can demonstrate a 2-3 second inspiratory hold  4. Bronchodilators will be administered via Hand Held Nebulizer [facility]) to patients when ANY of the following criteria are met  a. Incognizant or uncooperative          b. Patients treated with HHN at Home        c. Unable to demonstrate proper use of MDI with spacer     d. RR > 30 bpm   5.  Bronchodilators will be delivered via Metered Dose Inhaler (MDI), HHN, Aerogen to intubated patients on mechanical ventilation. 6. Inhalation medication orders will be delivered and/or substituted as outlined below. Aerosolized Medications Ordering and Administration Guidelines:    1. All Medications will be ordered by a physician, and their frequency and/or modality will be adjusted as defined by the patients Respiratory Severity Index (RSI) score. 2. If the patient does not have documented COPD, consider discontinuing anticholinergics when RSI is less than 9.  3. If the bronchospasm worsens (increased RSI), then the bronchodilator frequency can be increased to a maximum of every 4 hours. If greater than every 4 hours is required, the physician will be contacted. 4. If the bronchospasm improves, the frequency of the bronchodilator can be decreased, based on the patient's RSI, but not less than home treatment regimen frequency. 5. Bronchodilator(s) will be discontinued if patient has a RSI less than 9 and has received no scheduled or as needed treatment for 72  Hrs. Patients Ordered on a Mucolytic Agent:    1. Must always be administered with a bronchodilator. 2. Discontinue if patient experiences worsened bronchospasm, or secretions have lessened to the point that the patient is able to clear them with a cough. Anti-inflammatory and Combination Medications:    1. If the patient lacks prior history of lung disease, is not using inhaled anti-inflammatory medication at home, and lacks wheezing by examination or by history for at least 24 hours, contact physician for possible discontinuation.

## 2020-12-11 NOTE — PROGRESS NOTES
4 Eyes Skin Assessment     The patient is being assess for  Admission    I agree that 2 RN's have performed a thorough Head to Toe Skin Assessment on the patient. ALL assessment sites listed below have been assessed. Areas assessed by both nurses: ***  [x]   Head, Face, and Ears   [x]   Shoulders, Back, and Chest  [x]   Arms, Elbows, and Hands   [x]   Coccyx, Sacrum, and Ischum  [x]   Legs, Feet, and Heels        Does the Patient have Skin Breakdown?   No         Jose De Jesus Prevention initiated:  Yes   Wound Care Orders initiated:  No      [facility] nurse consulted for Pressure Injury (Stage 3,4, Unstageable, DTI, NWPT, and Complex wounds):  No      Nurse 1 eSignature: Electronically signed by Everette Al RN on 12/10/20 at 9:49 PM EST    **SHARE this note so that the co-signing nurse is able to place an eSignature**    Nurse 2 eSignature: {Esignature:[id number]}

## 2020-12-11 NOTE — H&P
supple  Neurology: Cranial nerves grossly intact. Alert and oriented in time, place and person. No speech or motor deficits  Psychiatry: Appropriate affect. Not agitated  Skin: Warm, dry, normal turgor, no rash  Brisk capillary refill, peripheral pulses palpable   Labs:  CBC:   Lab Results   Component Value Date    WBC 4.7 12/10/2020    RBC 5.51 12/10/2020    HGB 15.2 12/10/2020    HCT 46.0 12/10/2020    MCV 83.5 12/10/2020    MCH 27.5 12/10/2020    MCHC 32.9 12/10/2020    RDW 13.9 12/10/2020     12/10/2020    MPV 7.7 12/10/2020     BMP:    Lab Results   Component Value Date     12/10/2020    K 5.0 12/10/2020     12/10/2020    CO2 23 12/10/2020    BUN 14 12/10/2020    CREATININE 0.8 12/10/2020    CALCIUM 8.8 12/10/2020    GFRAA >60 12/10/2020    LABGLOM >60 12/10/2020    GLUCOSE 114 12/10/2020     XR CHEST PORTABLE   Final Result   Left lower lobe pneumonia.            Chest Xray:   EKG:    Ventricular Rate  83  BPM  Final  12/10/2020  3:31 PM  14    Atrial Rate  83  BPM  Final  12/10/2020  3:31 PM  14    P-R Interval  138  ms  Final  12/10/2020  3:31 PM  14    QRS Duration  84  ms  Final  12/10/2020  3:31 PM  14    Q-T Interval  370  ms  Final  12/10/2020  3:31 PM  14    QTc Calculation (Bazett)  434  ms  Final  12/10/2020  3:31 PM  14    P Axis  22  degrees  Final  12/10/2020  3:31 PM  14    R Axis  -16  degrees  Final  12/10/2020  3:31 PM  14    T Axis  12  degrees  Final  12/10/2020  3:31 PM  14    Diagnosis    Final  12/10/2020  3:31 PM  14    Normal sinus rhythmVoltage criteria for left ventricular hypertrophyNonspecific ST abnormalityAbnormal ECGNo previous ECGs availableConfirmed by Michelle Lee MD, Rios Babcock I visualized CXR images and EKG strips and agree with documented interpretation    Discussed case  with ED provider    Problem List  Active Problems:    Pneumonia due to COVID-19 virus    Acute hypoxemic respiratory failure due to COVID-19 [facility])    Morbid obesity with body mass index (BMI) of 50.0 to 59.9 in adult (HCC)    Elevated BP without diagnosis of hypertension    RAD (reactive airway disease)    Acute hypoxemic respiratory failure due to severe acute respiratory syndrome coronavirus 2 (SARS-CoV-2) disease ([facility] [address]  Resolved Problems:    * No resolved hospital problems. *        Assessment/Plan:     Acute respiratory failure with pneumonia secondary to COVID-19 infection  -Admit to telemetry floor with droplet plus precautions  -Encourage incentive spirometry every 2 hours while awake  -Albuterol MDI 4 times daily scheduled; no nebulizer secondary to active Covid  -IV Solu-Medrol 40 mg every 12 hours initiated  -Lovenox 40 mg subcu twice daily initiated  -Blood cultures collected and patient initiated on IV Rocephin and Zithromax    Mild hyperglycemia with BMI of 56.7  -Anticipate acute hyperglycemia with IV steroid use  -Carb restriction placed on diet  -Humalog SSI as needed    Elevated BP with LVH per EKG  -Suspect patient has untreated underlying hypertension  -Clonidine 0.1 mg every 8 hours with BP parameters placed  -Recommend echo following resolution of SARS-CoV-2 to further assess cardiac function    DVT prophylaxis-Lovenox 40 mg subcu twice daily per Covid guidelines  Code status-full code  Diet-cardiac with carb restrictions  IV access-PIV established in ED      Admit as inpatient. I anticipate hospitalization spanning more than two midnights for investigation and treatment of the above medically necessary diagnoses. Please note that some part of this chart was generated using Dragon dictation software. Although every effort was made to ensure the accuracy of this automated transcription, some errors in transcription may have occurred inadvertently. If you may need any clarification, please do not hesitate to contact me through [facility].        Amrik Davies MD    12/10/2020 11:47 PM

## 2020-12-11 NOTE — CARE COORDINATION
CASE MANAGEMENT INITIAL ASSESSMENT      Reviewed chart and completed assessment via telephone with: Pt  Explained Case Management role/services. Primary contact information:Corinne Berkowitz. Health Care Decision Maker :   Primary Decision Maker: Jah Pizano - Parent - [phone]          Can this person be reached and be able to respond quickly, such as within a few minutes or hours? Yes      Admit date/status: IP, 12/10/20  Diagnosis: 12/10/20   Is this a Readmission?:  No      Insurance: [place] [phone] required for SNF: Yes       3 night stay required: No    Living arrangements, Adls, care needs, prior to admission: Lives in a two story condo, 13 steps upstairs and 3SE. Lives alone and independent in all ADL's, Drives and is still employed mainly working from home currently. Transportation:family     Durable Medical Equipment at home:  None      PT/OT recs: None seen at this time. Barriers to discharge: None    Plan/comments: CM spoke to pt on the phone for initial assessment. Pt plans to return back home and denies any DCP needs at this time. Pt currently on 2L of oxygen.  CM will following-Sherri Arroyo RN      ECOC on chart for MD signature

## 2020-12-12 VITALS
DIASTOLIC BLOOD PRESSURE: 98 MMHG | TEMPERATURE: 97.9 F | HEIGHT: 64 IN | RESPIRATION RATE: 16 BRPM | HEART RATE: 100 BPM | SYSTOLIC BLOOD PRESSURE: 142 MMHG | BODY MASS INDEX: 50.02 KG/M2 | OXYGEN SATURATION: 96 % | WEIGHT: 293 LBS

## 2020-12-12 LAB
A/G RATIO: 1 (ref 1.1–2.2)
ALBUMIN SERPL-MCNC: 3.8 G/DL (ref 3.4–5)
ALP BLD-CCNC: 82 U/L (ref 40–129)
ALT SERPL-CCNC: 47 U/L (ref 10–40)
ANION GAP SERPL CALCULATED.3IONS-SCNC: 11 MMOL/L (ref 3–16)
AST SERPL-CCNC: 32 U/L (ref 15–37)
BILIRUB SERPL-MCNC: 0.3 MG/DL (ref 0–1)
BUN BLDV-MCNC: 16 MG/DL (ref 7–20)
CALCIUM SERPL-MCNC: 9.3 MG/DL (ref 8.3–10.6)
CHLORIDE BLD-SCNC: 105 MMOL/L (ref 99–110)
CO2: 26 MMOL/L (ref 21–32)
CREAT SERPL-MCNC: 0.7 MG/DL (ref 0.6–1.1)
D DIMER: 538 NG/ML DDU (ref 0–229)
FIBRINOGEN: 409 MG/DL (ref 200–397)
GFR AFRICAN AMERICAN: >60
GFR NON-AFRICAN AMERICAN: >60
GLOBULIN: 4 G/DL
GLUCOSE BLD-MCNC: 122 MG/DL (ref 70–99)
GLUCOSE BLD-MCNC: 122 MG/DL (ref 70–99)
GLUCOSE BLD-MCNC: 144 MG/DL (ref 70–99)
INR BLD: 1.01 (ref 0.86–1.14)
PERFORMED ON: ABNORMAL
PERFORMED ON: ABNORMAL
POTASSIUM REFLEX MAGNESIUM: 3.7 MMOL/L (ref 3.5–5.1)
PROTHROMBIN TIME: 11.7 SEC (ref 10–13.2)
SODIUM BLD-SCNC: 142 MMOL/L (ref 136–145)
TOTAL PROTEIN: 7.8 G/DL (ref 6.4–8.2)

## 2020-12-12 PROCEDURE — 80053 COMPREHEN METABOLIC PANEL: CPT

## 2020-12-12 PROCEDURE — 6360000002 HC RX W HCPCS: Performed by: FAMILY MEDICINE

## 2020-12-12 PROCEDURE — 36415 COLL VENOUS BLD VENIPUNCTURE: CPT

## 2020-12-12 PROCEDURE — 85610 PROTHROMBIN TIME: CPT

## 2020-12-12 PROCEDURE — 2700000000 HC OXYGEN THERAPY PER DAY

## 2020-12-12 PROCEDURE — 85384 FIBRINOGEN ACTIVITY: CPT

## 2020-12-12 PROCEDURE — 85379 FIBRIN DEGRADATION QUANT: CPT

## 2020-12-12 PROCEDURE — 6360000002 HC RX W HCPCS: Performed by: HOSPITALIST

## 2020-12-12 PROCEDURE — 94761 N-INVAS EAR/PLS OXIMETRY MLT: CPT

## 2020-12-12 PROCEDURE — 6370000000 HC RX 637 (ALT 250 FOR IP): Performed by: HOSPITALIST

## 2020-12-12 RX ORDER — DEXAMETHASONE 6 MG/1
6 TABLET ORAL DAILY
Qty: 7 TABLET | Refills: 0 | Status: SHIPPED | OUTPATIENT
Start: 2020-12-13 | End: 2020-12-20

## 2020-12-12 RX ADMIN — INSULIN LISPRO 2 UNITS: 100 INJECTION, SOLUTION INTRAVENOUS; SUBCUTANEOUS at 12:22

## 2020-12-12 RX ADMIN — ENOXAPARIN SODIUM 40 MG: 40 INJECTION SUBCUTANEOUS at 09:03

## 2020-12-12 RX ADMIN — FAMOTIDINE 20 MG: 20 TABLET ORAL at 09:00

## 2020-12-12 RX ADMIN — DEXAMETHASONE 6 MG: 4 TABLET ORAL at 09:00

## 2020-12-12 RX ADMIN — Medication 2000 UNITS: at 09:00

## 2020-12-12 NOTE — PROGRESS NOTES
Perfect serve Dr. Coppola Corrina: patient has remained off O2 and stats have remained in low-mid 90's. preferred pharm listed correct= [facility] in [facility]. Patient hopeful for DC.   Thanks :)

## 2020-12-12 NOTE — PROGRESS NOTES
Discharge paperwork reviewed and signed with patient. IV and tele removed. [facility] notified of pending discharge. Patient denies any questions at this time, contacting family member for transportation. Will continue to monitor.

## 2020-12-12 NOTE — DISCHARGE SUMMARY
Hospital Medicine Discharge Summary    Patient ID: Cinthya Melara      Patient's PCP: Faustino Simpson MD    Admit Date: 12/10/2020     Discharge Date:   12/12/2020     Admitting Physician: Arjun Lee MD     Discharge Physician: Cricket Thorpe MD     Discharge Diagnoses: Active Hospital Problems    Diagnosis    Morbid obesity with body mass index (BMI) of 50.0 to 59.9 in adult ([facility] [E66.01, Z68.43]    Pneumonia due to COVID-19 virus [U07.1, J12.89]    Acute hypoxemic respiratory failure due to COVID-19 (HCC) [U07.1, J96.01]    RAD (reactive airway disease) [J45.909]    Elevated BP without diagnosis of hypertension [R03.0]    Acute hypoxemic respiratory failure due to severe acute respiratory syndrome coronavirus 2 (SARS-CoV-2) disease ([facility] [U07.1, J96.01]       The patient was seen and examined on day of discharge and this discharge summary is in conjunction with any daily progress note from day of discharge. Hospital Course:   Cinthya Melara is 41 y.o. who was admitted for hypoxia up to 2 L NC and dyspnea due to COVID 19 PNA. Patient treated with steroids and will be discharged on decadron 6 mg x 7 days to completed 10 day course. She was weaned to room air and her dyspnea improved. Patient instructed on how long to isolate for her COVID19 infection. Hba1c noted to be 6.9 during admission, patient will start low dose metformin on discharge and follow up with her PCP. Physical Exam Performed:     BP (!) 142/98   Pulse 100   Temp 97.9 °F (36.6 °C) (Oral)   Resp 16   Ht 5' 3.5\" (1.613 m)   Wt (!) 341 lb (154.7 kg)   LMP 10/10/2020   SpO2 96%   BMI 59.46 kg/m²       General appearance:  No apparent distress, alert and cooperative. HEENT:  Conjunctivae/corneas clear. Neck supple with full ROM. Respiratory:  Normal respiratory effort. Clear to auscultation, bilaterally without Rales/Wheezes/Rhonchi. Cardiovascular:  Regular rate and rhythm, normal S1/S2, no murmurs  Abdomen: Soft, non-tender, non-distended with normal bowel sounds. Musculoskeletal:  No edema bilaterally. Neurologic:   grossly non-focal.  Psychiatric:  Alert and oriented, normal insight  Capillary Refill: Brisk,< 3 seconds   Peripheral Pulses: +2 palpable, equal bilaterally       Labs: For convenience and continuity at follow-up the following most recent labs are provided:      CBC:    Lab Results   Component Value Date    WBC 4.7 12/10/2020    HGB 15.2 12/10/2020    HCT 46.0 12/10/2020     12/10/2020       Renal:    Lab Results   Component Value Date     12/12/2020    K 3.7 12/12/2020     12/12/2020    CO2 26 12/12/2020    BUN 16 12/12/2020    CREATININE 0.7 12/12/2020    CALCIUM 9.3 12/12/2020         Significant Diagnostic Studies    Radiology:   XR CHEST PORTABLE   Final Result   Left lower lobe pneumonia. Consults:     IP CONSULT TO HOSPITALIST    Disposition:  home     Condition at Discharge: Stable      Code Status:  Full Code     Activity: activity as tolerated    Diet: diabetic diet      Discharge Medications:     Current Discharge Medication List           Details   dexamethasone (DECADRON) 6 MG tablet Take 1 tablet by mouth daily for 7 days  Qty: 7 tablet, Refills: 0      metFORMIN (GLUCOPHAGE) 500 MG tablet Take 1 tablet by mouth daily (with breakfast)  Qty: 30 tablet, Refills: 0              Details   aspirin-acetaminophen-caffeine (EXCEDRIN MIGRAINE) 250-250-65 MG per tablet Take by mouth      albuterol sulfate  (90 Base) MCG/ACT inhaler INHALE 2 PUFFS BY MOUTH EVERY SIX HOURS AS NEEDED      benzonatate (TESSALON) 200 MG capsule TAKE 1 CAPSULE BY MOUTH THREE TIMES A DAY AS NEEDED FOR COUGH             Time Spent on discharge is more than 30 minutes in the examination, evaluation, counseling and review of medications and discharge plan.       Signed:    Manisha Treviño MD   12/12/2020 Thank you Gabrielle Burr MD for the opportunity to be involved in this patient's care.  If you have any questions or concerns please feel free to contact me

## 2020-12-14 ENCOUNTER — CARE COORDINATION (OUTPATIENT)
Dept: CASE MANAGEMENT | Age: 40
End: 2020-12-14

## 2020-12-14 LAB — BLOOD CULTURE, ROUTINE: NORMAL

## 2020-12-14 NOTE — CARE COORDINATION
[facility] Transitions Initial Follow Up Call    Call within 2 business days of discharge: Yes    Patient: Jorge Rodriguez Patient : 1980   MRN: [id number]  Reason for Admission: ARF with hypoxia   Discharge Date: 20 RARS: Readmission Risk Score: 10      Last Discharge [facility]       Complaint Diagnosis Description Type Department Provider    12/10/20 Shortness of Breath Acute respiratory failure with hypoxia ([address] . .. ED to Hosp-Admission (Discharged) (ADMITTED) Hailey Sun MD; Migdalia Perez. .. Spoke with: [address]250: [facility]       Challenges to be reviewed by the provider   Additional needs identified to be addressed with provider No  none    Discussed COVID-19 related testing which was available at this time. Test results were positive. Patient informed of results, if available? Yes         Method of communication with provider : none    Advance Care Planning:   Does patient have an Advance Directive:  not on file. Was this a readmission? No  Patient stated reason for admission: sob   Patients top risk factors for readmission: medical condition    Care Transition Nurse (CTN) contacted the patient by telephone to perform post hospital discharge assessment. Verified name and  with patient as identifiers. Provided introduction to self, and explanation of the CTN role. CTN reviewed discharge instructions, medical action plan and red flags with patient who verbalized understanding. Patient given an opportunity to ask questions and does not have any further questions or concerns at this time. Were discharge instructions available to patient? Yes. Reviewed appropriate site of care based on symptoms and resources available to patient including: PCP and When to call 911. The patient agrees to contact the PCP office for questions related to their healthcare.      Medication reconciliation was performed with patient, who verbalizes understanding of administration of home medications. Advised obtaining a 90-day supply of all daily and as-needed medications. Covid Risk Education    Patient has following risk factors of: no known risk factors. Education provided regarding infection prevention, and signs and symptoms of COVID-19 and when to seek medical attention with patient who verbalized understanding. Discussed exposure protocols and quarantine From [facility]: Are you at higher risk for severe illness?   and given an opportunity for questions and concerns. The patient agrees to contact the COVID-19 hotline [phone] or PCP office for questions related to COVID-19. For more information on steps you can take to protect yourself, see CDC's How to Protect Yourself       Discussed follow-up appointments. If no appointment was previously scheduled, appointment scheduling offered: Yes. Is follow up appointment scheduled within 7 days of discharge? No  [facility] follow up appointment(s): Patient to call PCP office     Plan for follow-up call in 5-7 days based on severity of symptoms and risk factors. Spoke with patient who reported she is feeling much better. Patient reported her breathing is stable and she continues to use her IS at home. Patient reported she still has some fatigue but is aware it will take time to improve. Reviewed new, changed, and stopped medications with patient who reported she is taking all as prescribed. Patient encouraged to reach out to her PCP to setup follow up apt. Patient reported she was instructed to quarantine until 12/18. Denies any acute needs at present time. Agreeable to f/u calls. Educated on the use of urgent care or physicians 24 hr access line if assistance is needed after hours. CTN provided contact information for future needs.           Care Transitions 24 Hour Call    Do you have any ongoing symptoms?: No  Do you have a copy of your discharge instructions?: Yes  Do you have all of your prescriptions and

## 2020-12-14 NOTE — ADT AUTH CERT
Pneumonia - Care Day 2 (12/11/2020) by Zainab Watkins RN       Review Status Review Entered   Completed 12/14/2020 08:04      Criteria Review      Care Day: 2 Care Date: 12/11/2020 Level of Care: Inpatient Floor    Guideline Day 2    Level Of Care    (X) Floor    Clinical Status    ( ) * No CO2 retention or acidosis    (X) * No requirement for mechanical ventilation    (X) * Hypotension absent    12/14/2020 8:04 AM EST by Rishi Patel      HR 79  /93    (X) * Afebrile or fever improved    12/14/2020 8:04 AM EST by Rishi Patel      98.2    ( ) * No hypoxia on room air or oxygenation improved    12/14/2020 8:04 AM EST by Rishi Patel      94% on 2 liter nc    ( ) * Mental status improved or at baseline    Activity    ( ) * Increased activity    Routes    (X) Oral hydration, medications    12/14/2020 8:04 AM EST by Rishi Patel      albuterol 2 puffs bid  lovenox 40mg sc bid  solumedrol 40mg iv is given x doses today  ivf dc'd today    (X) Usual diet    12/14/2020 8:04 AM EST by Rishi Patel      carb controlled diet, ssi    Interventions    (X) Pulse oximetry    (X) Possible oxygen    Medications    (X) IV or oral antibiotics    12/14/2020 8:04 AM EST by Rishi Patel      zithromax 500mg iv given  rocephin 1 gm iv given    * Milestone   Additional Notes   12/11   Per Hospitalist:   COVID 19 PNA   - droplet plus contact isolation   - IS   - c/w inhalers   - switch from IV solu-medrol to decadron 6mg daily to complete total 10 day course of steroids   - low pro calcitonin, d/c abx   - patient weaned off O2, will not initiate remdesivir or convalescent plasma at this time, if worsening would reconsider       Acute respiratory failure with hypoxia   - on 2 L NC, weaned to RA, monitor       Hyperglycemia   - in setting of steroids   - c/w ssi   - a1c - 6.9 - will recommend metformin at discharge      covid positive by Zainab Watkins RN       Review Status Review Entered   In Primary 12/11/2020 08:46     Criteria Review   The illness suspected to be related to the Coronavirus (COVID-19)? Yes  Has the member been tested for the COVID-19? Yes   If Yes, what are the results of the COVID-19? Positive  What is the severity of the members condition (i.e. Isolation, Ventilator use)?   Hypoxia, 89%ra

## 2020-12-21 ENCOUNTER — CARE COORDINATION (OUTPATIENT)
Dept: CASE MANAGEMENT | Age: 40
End: 2020-12-21

## 2020-12-21 NOTE — CARE COORDINATION
COVID-19 Monitoring Follow up call:     Final attempt made to reach patient for follow up call. Left a voice message for patient with my contact information and informed of final outreach attempt.        Ramin ROMERO, RN, [facility]  Care Transition Nurse   [phone] office  [phone] mobile